# Patient Record
Sex: FEMALE | Race: WHITE | URBAN - METROPOLITAN AREA
[De-identification: names, ages, dates, MRNs, and addresses within clinical notes are randomized per-mention and may not be internally consistent; named-entity substitution may affect disease eponyms.]

---

## 2018-05-02 ENCOUNTER — OFFICE VISIT (OUTPATIENT)
Dept: FAMILY MEDICINE | Facility: CLINIC | Age: 28
End: 2018-05-02
Payer: OTHER MISCELLANEOUS

## 2018-05-02 VITALS
SYSTOLIC BLOOD PRESSURE: 120 MMHG | HEIGHT: 64 IN | WEIGHT: 176.1 LBS | DIASTOLIC BLOOD PRESSURE: 70 MMHG | HEART RATE: 77 BPM | BODY MASS INDEX: 30.07 KG/M2 | OXYGEN SATURATION: 98 % | TEMPERATURE: 97.7 F

## 2018-05-02 DIAGNOSIS — S61.212A LACERATION OF RIGHT MIDDLE FINGER WITHOUT FOREIGN BODY WITHOUT DAMAGE TO NAIL, INITIAL ENCOUNTER: Primary | ICD-10-CM

## 2018-05-02 PROCEDURE — 12001 RPR S/N/AX/GEN/TRNK 2.5CM/<: CPT | Performed by: NURSE PRACTITIONER

## 2018-05-02 PROCEDURE — 99202 OFFICE O/P NEW SF 15 MIN: CPT | Mod: 25 | Performed by: NURSE PRACTITIONER

## 2018-05-02 NOTE — PROGRESS NOTES
"  SUBJECTIVE:   Taneshadarrion Garcia is a 28 year old female who presents to clinic today for the following health issues:    Chief Complaint   Patient presents with     Laceration     patient has laceration on R hand, middle digit   patient currently using band-aid to protect and apply pressure. Still bleeding without band-aid.    {additional problems for provider to add:776322}    Problem list and histories reviewed & adjusted, as indicated.  Additional history: {NONE - AS DOCUMENTED:037158::\"as documented\"}    {HIST REVIEW/ LINKS 2:954120}    Reviewed and updated as needed this visit by clinical staff       Reviewed and updated as needed this visit by Provider         {PROVIDER CHARTING PREFERENCE:787002}    "

## 2018-05-02 NOTE — NURSING NOTE
"Chief Complaint   Patient presents with     Laceration     patient has laceration on R hand, middle digit       Initial Pulse 77  Temp 97.7  F (36.5  C) (Tympanic)  Ht 5' 4\" (1.626 m)  Wt 176 lb 1.6 oz (79.9 kg)  SpO2 98%  Breastfeeding? No  BMI 30.23 kg/m2 Estimated body mass index is 30.23 kg/(m^2) as calculated from the following:    Height as of this encounter: 5' 4\" (1.626 m).    Weight as of this encounter: 176 lb 1.6 oz (79.9 kg).  Medication Reconciliation: complete     Neptali Hardy, MAIA     "

## 2018-05-02 NOTE — MR AVS SNAPSHOT
"              After Visit Summary   2018    Tanesha Garcia    MRN: [id number]           Patient Information     Date Of Birth          1990        Visit Information        Provider Department      2018 3:00 PM Ramone Chavez APRN CNP [facility]        Today's Diagnoses     Laceration of right middle finger without foreign body without damage to nail, initial encounter    -  1       Follow-ups after your visit        Who to contact     If you have questions or need follow up information about today's clinic visit or your schedule please contact [facility] directly at [phone].  Normal or non-critical lab and imaging results will be communicated to you by Ateohart, letter or phone within 4 business days after the clinic has received the results. If you do not hear from us within 7 days, please contact the clinic through Ateohart or phone. If you have a critical or abnormal lab result, we will notify you by phone as soon as possible.  Submit refill requests through Electronifie or call your pharmacy and they will forward the refill request to us. Please allow 3 business days for your refill to be completed.          Additional Information About Your Visit        MyChart Information     Electronifie lets you send messages to your doctor, view your test results, renew your prescriptions, schedule appointments and more. To sign up, go to [email or web address] . Click on \"Log in\" on the left side of the screen, which will take you to the Welcome page. Then click on \"Sign up Now\" on the right side of the page.     You will be asked to enter the access code listed below, as well as some personal information. Please follow the directions to create your username and password.     Your access code is: [email or web address]  Expires: 2018  4:42 PM     Your access code will  in 90 days. If you need help or a new code, please call your [facility] or [phone].        Care " "EveryWhere ID     This is your Care EveryWhere ID. This could be used by other organizations to access your [place] medical records  [id number]        Your Vitals Were     Pulse Temperature Height Pulse Oximetry Breastfeeding? BMI (Body Mass Index)    77 97.7  F (36.5  C) (Tympanic) 5' 4\" (1.626 m) 98% No 30.23 kg/m2       Blood Pressure from Last 3 Encounters:   05/02/18 120/70    Weight from Last 3 Encounters:   05/02/18 176 lb 1.6 oz (79.9 kg)              We Performed the Following     REPAIR INTERMED, WOUND NCK/HNDS/FEET/GEN <=2.5 CM          Today's Medication Changes          These changes are accurate as of 5/2/18  4:42 PM.  If you have any questions, ask your nurse or doctor.               Stop taking these medicines if you haven't already. Please contact your care team if you have questions.     ALBUTEROL INHALER 17GM   Stopped by:  Ramone Chavez APRN CNP                    Primary Care Provider Office Phone # Fax #    [facility] [phone]       [address] [address]        Equal Access to Services     TIA ASHRAF AH: Hadii barak French, waaxda luqadaha, qaybta kaalmada adetabathayada, jovani espinoza. So [facility] [phone].    ATENCIÓN: Si habla español, tiene a sullivan disposición servicios gratuitos de asistencia lingüística. [facility] [phone].    We comply with applicable federal civil rights laws and [place] laws. We do not discriminate on the basis of race, color, national origin, age, disability, sex, sexual orientation, or gender identity.            Thank you!     Thank you for choosing [facility]  for your care. Our goal is always to provide you with excellent care. Hearing back from our patients is one way we can continue to improve our services. Please take a few minutes to complete the written survey that you may receive in the mail after your visit with us. Thank you!   "           Your Updated Medication List - Protect others around you: Learn how to safely use, store and throw away your medicines at www.disposemymeds.org.      Notice  As of 5/2/2018  4:42 PM    You have not been prescribed any medications.

## 2018-05-02 NOTE — PROGRESS NOTES
"HPI  SUBJECTIVE:   Tanesha Garcia is a 28 year old female who presents to clinic today for the following health issues:    Chief Complaint   Patient presents with     Laceration     patient has laceration on R hand, middle digit   patient currently using band-aid to protect and apply pressure. Still bleeding without band-aid.    2.5 hours ago hit finger on a door frame. Continuing to bleed. Somewhat painful. No loss of sensation. Has normal ROM. TD utd.     No past medical history on file.  No family history on file.  No past surgical history on file.  Social History   Substance Use Topics     Smoking status: Current Some Day Smoker     Types: Cigars     Smokeless tobacco: Never Used     Alcohol use Yes      Comment: rare     No current outpatient prescriptions on file.     No Known Allergies    Reviewed and updated as needed this visit by clinical staff and provider        ROS  Detailed as above       Pulse 77  Temp 97.7  F (36.5  C) (Tympanic)  Ht 5' 4\" (1.626 m)  Wt 176 lb 1.6 oz (79.9 kg)  SpO2 98%  Breastfeeding? No  BMI 30.23 kg/m2    Physical Exam   Constitutional: She is well-developed, well-nourished, and in no distress.   Pulmonary/Chest: Effort normal.   Musculoskeletal: Normal range of motion.   Neurological: She is alert.   Normal R 3rd finger sensation   Skin:   1cm slightly curved lac overlying right radial 3rd PIP that appears clean   Psychiatric: Mood and affect normal.   Vitals reviewed.      Assessment and Plan:       ICD-10-CM    1. Laceration of right middle finger without foreign body without damage to nail, initial encounter S61.212A REPAIR INTERMED, WOUND NCK/HNDS/FEET/GEN <=2.5 CM         1cm superficial lac of right 3rd finger was cleansed with antimicrobial wash. Skin glue was used to approximate edges. Tubegauze was then placed as bandage. Pt tolerated well     Monitor for infection. Call with concerns       Ramone Chavez, APRN, CNP  [facility]    "